# Patient Record
Sex: FEMALE | Race: WHITE | NOT HISPANIC OR LATINO | URBAN - NONMETROPOLITAN AREA
[De-identification: names, ages, dates, MRNs, and addresses within clinical notes are randomized per-mention and may not be internally consistent; named-entity substitution may affect disease eponyms.]

---

## 2018-03-07 PROBLEM — H10.423: Noted: 2019-02-26

## 2018-03-07 PROBLEM — H46.13: Noted: 2019-02-26

## 2018-03-07 PROBLEM — H16.223: Noted: 2018-03-07

## 2018-03-07 PROBLEM — H25.813: Noted: 2021-01-20

## 2018-03-07 PROBLEM — H02.403: Noted: 2019-02-26

## 2018-03-07 PROBLEM — H10.423: Noted: 2021-01-20

## 2018-03-07 PROBLEM — H52.13: Noted: 2019-10-22

## 2018-03-07 PROBLEM — H02.403: Noted: 2019-10-22

## 2018-03-07 PROBLEM — H52.4: Noted: 2021-01-20

## 2018-03-07 PROBLEM — H46.13: Noted: 2021-01-20

## 2018-03-07 PROBLEM — H52.4: Noted: 2019-10-22

## 2019-02-26 ENCOUNTER — IMPORTED ENCOUNTER (OUTPATIENT)
Dept: URBAN - NONMETROPOLITAN AREA CLINIC 1 | Facility: CLINIC | Age: 54
End: 2019-02-26

## 2019-02-26 PROCEDURE — 99213 OFFICE O/P EST LOW 20 MIN: CPT

## 2019-02-26 NOTE — PATIENT DISCUSSION
AMIE  OU severe Allergies OU- Discussed findings in detail w/ pt today. - Signs/symptoms associated discussed. - [facility] improved on today's exam. - Patient also admits that she plucks her own eyelashes out PRN as she thinks they are turning in and poking her eye. Asked her not to do this on her own as it is not a sterile technique. - Hx of punctal plug placement noted to be in place today. - Continue all drops/regimen as noted below without changes. ...- Oculus 5M Keratograph done previously (2/2018). - Patient has used Refresh (bottle) and Retaine (vials) in the past neither of them worked well. - Recommended using Refresh Optive PF OU QID PRN stressed importance of using drops that are preservative free. Pt agreed. - Continue E-mycin ointment QHS. - Continue increased water intake- Patient using sleep mask at night and doing better- d/c all Visine products never to use and explained reasoning.- Continue TheraTears Fish Oil supplementation- Continue Doxycycline 50 mg PO QD (restarted 1 month ago after a phone call request by pt)- d/c Lotemax to TID OU taper to BID and the QD.- Continue Pred Forte OU TID x 3 days BID x 3 days QD x 3 days then stop. Once completed start Bepreve OU BID PRN itching/irritation. Sample of the [facility] given today with instructions. - Patient is currently using Systane Q1.5H sample given today and Blink Tears Q1.5H. - Patient happy with the Dailies Total 1 contacts. Stressed importance of lubricating when wearing contacts. Patient may start wearing contacts again.- Continue to monitor -------------------Previous notes ----------------------------Myopia/Presbyopia - Patient happy with Daily Total 1 contact Rx given previously. - She states that she has still been getting the stringy discharge with her Cl's- MR done in the past: new glasses Rx givenOptic Neuritis:  - Discussed findings in detail w/ pt today- Hx of a flare up several years ago but better controlled now that she has her MS under control. - She does have evidence of pale nerves OU. - Baseline OCT done previously damage of optic disc noted OU. Will need to monitor closely for changes. - Baseline PACH done previously  and . - Reviewed notes from other offices from prior exams. - IOP stable last visit so we will check again next time. - Will likely need to obtain VF in near future as well but due to good [facility] OU today will hold off for now. - Continue to monitor closely. [place] OU- Discussed diagnosis in detail with patient- Discussed signs and symptoms of progression- Discussed UV protection- No treatment needed at this time - Continue to monitorPtosis OU- Discussed findings in detail w/ pt today- Signs/symptoms associated with worsening discussed. - Pt had an evaluation by Dr. Abdulaziz Rowan recently but did not recommend sx due to heavy PEK OD>OS. - Ptosis (the upper eyelid being in a lower than normal position) of the upper eyelid was explained to the patient. - Will continue to monitor PRNHx of Migraines- Discussed findings w/ pt previously- No reported headaches/problems by patient today- Monitor for recurrence PRN; Dr's Notes: Family Hx of ARMD in [place]. Opt[facility] 7/10/18 - [place] Hx_____________Oculus 5M - 2/22/18-  Oculus 5M Keratograph done previously:  Meibography:OD - about 50% lossOS - 25% lossTear menicus:  OD - avg 0.31OS - avg 0. 22Redness:  OD - 0.3OS - 0.7NIK-BUT: OD - 7.28OS - 13. 56Emycin ointment - started by previous doctor continued by me as of 3/2018Visine - d/c 1/19/18 1st appt w/ meMuro - d/c 1/19/18 1st appt w/ mePred Forte - started 1/22/18 continues 3/13/18hot compresses - started 1/22/18 d/c 3/9/18 due to irritationDoxycycline 50mg PO BID - started 2/22/18Refresh Optive Advanced - started 1/2018 continues 3/2018Retaine - started 3/7/18 but d/c 3/9/18 due to irritation/redness possible allergy

## 2019-03-29 ENCOUNTER — IMPORTED ENCOUNTER (OUTPATIENT)
Dept: URBAN - NONMETROPOLITAN AREA CLINIC 1 | Facility: CLINIC | Age: 54
End: 2019-03-29

## 2019-03-29 PROCEDURE — 99213 OFFICE O/P EST LOW 20 MIN: CPT

## 2019-03-29 NOTE — PATIENT DISCUSSION
AMIE  OU severe Allergies OU- Discussed findings in detail w/ pt today. - Signs/symptoms associated discussed. - Patient also admits that she plucks her own eyelashes out PRN as she thinks they are turning in and poking her eye. Asked her not to do this on her own as it is not a sterile technique. - Hx of punctal plug placement noted to be in place today. - Continue all drops/regimen as noted below without changes. ...- Oculus 5M Keratograph done previously (2/2018). - Patient has used Refresh (bottle) and Retaine (vials) in the past neither of them worked well. - Recommended using Refresh Optive PF OU QID PRN stressed importance of using drops that are preservative free. Pt agreed. - Continue E-mycin ointment QHS. - Continue increased water intake- Patient using sleep mask at night and doing better- d/c all Visine products never to use and explained reasoning.- Continue TheraTears Fish Oil supplementation- Continue Doxycycline 50 mg PO QD - Hx of using Pred Forte and Lotemax - Use Bepreve QAM OU. - Start Pred Forte QD OU (once before bedtime) - Patient is currently using Systane Q1.5H sample given today and Blink Tears Q1.5H. - Patient happy with the [employer] Total 1 contacts. Stressed importance of lubricating when wearing contacts. Patient may start wearing contacts again.- Continue to monitor -------------------Previous notes ----------------------------Myopia/Presbyopia - Patient happy with Daily Total 1 contact Rx given previously. - She states that she has still been getting the stringy discharge with her Cl's- MR done in the past: new glasses Rx givenOptic Neuritis:  - Discussed findings in detail w/ pt today- Hx of a flare up several years ago but better controlled now that she has her MS under control. - She does have evidence of pale nerves OU. - Baseline OCT done previously damage of optic disc noted OU. Will need to monitor closely for changes. - Baseline PACH done previously  and . - Reviewed notes from other offices from prior exams. - IOP stable last visit so we will check again next time. - Will likely need to obtain VF in near future as well but due to good [facility] OU today will hold off for now. - Continue to monitor closely. [place] OU- Discussed diagnosis in detail with patient- Discussed signs and symptoms of progression- Discussed UV protection- No treatment needed at this time - Continue to monitorPtosis OU- Discussed findings in detail w/ pt today- Signs/symptoms associated with worsening discussed. - Pt had an evaluation by Dr. Madiha Shelley recently but did not recommend sx due to heavy PEK OD>OS. - Ptosis (the upper eyelid being in a lower than normal position) of the upper eyelid was explained to the patient. - Will continue to monitor PRNHx of Migraines- Discussed findings w/ pt previously- No reported headaches/problems by patient today- Monitor for recurrence PRN; Dr's Notes: Family Hx of ARMD in Grand[place]. Optos [facility] 7/10/18 - [place] Hx_____________Oculus 5M - 2/22/18-  Oculus 5M Keratograph done previously:  Meibography:OD - about 50% lossOS - 25% lossTear menicus:  OD - avg 0.31OS - avg 0. 22Redness:  OD - 0.3OS - 0.7NIK-BUT: OD - 7.28OS - 13. 56Emycin ointment - started by previous doctor continued by me as of 3/2018Visine - d/c 1/19/18 1st appt w/ meMuro - d/c 1/19/18 1st appt w/ mePred Forte - started 1/22/18 continues 3/13/18hot compresses - started 1/22/18 d/c 3/9/18 due to irritationDoxycycline 50mg PO BID - started 2/22/18Refresh Optive Advanced - started 1/2018 continues 3/2018Retaine - started 3/7/18 but d/c 3/9/18 due to irritation/redness possible allergy

## 2019-07-01 ENCOUNTER — IMPORTED ENCOUNTER (OUTPATIENT)
Dept: URBAN - NONMETROPOLITAN AREA CLINIC 1 | Facility: CLINIC | Age: 54
End: 2019-07-01

## 2019-07-01 PROCEDURE — 99213 OFFICE O/P EST LOW 20 MIN: CPT

## 2019-07-01 NOTE — PATIENT DISCUSSION
AMIE  OU severe Allergies OU- Discussed findings in detail w/ pt today. - Signs/symptoms associated discussed. - Patient also admits that she plucks her own eyelashes out PRN as she thinks they are turning in and poking her eye. Asked her not to do this on her own as it is not a sterile technique. - Hx of punctal plug placement noted to be in place today. - Continue all drops/regimen as noted below without changes. ...- Oculus 5M Keratograph done previously (2/2018). - Patient has used Refresh (bottle) and Retaine (vials) in the past neither of them worked well. - Recommended using Refresh Optive PF OU QID PRN stressed importance of using drops that are preservative free. Pt agreed. - Continue E-mycin ointment QHS. - Continue increased water intake- Patient using sleep mask at night and doing better- d/c all Visine products never to use and explained reasoning.- Continue TheraTears Fish Oil supplementation- Continue Doxycycline 50 mg PO QD - Hx of using Pred Forte and Lotemax - Continue Bepreve QAM OU. - Continue Pred Forte QD OU (once before bedtime). Recommend spacing out and using once every other day and try to taper off of the Pred. Do not recommend patient stay on this long term. - Patient is currently using Dry Eye Relief Q1.5H sample given today and Blink Tears Q1.5H. - Patient happy with the Dailies Total 1 contacts. Stressed importance of lubricating when wearing contacts. Patient may start wearing contacts again.- Continue to monitor - Patient to come in next week for contact lens check and Rx check (do not dilate)-------------------Previous notes ----------------------------Myopia/Presbyopia - Patient happy with Daily Total 1 contact Rx given previously. - She states that she has still been getting the stringy discharge with her Cl's- MR done in the past: new glasses Rx givenOptic Neuritis:  - Discussed findings in detail w/ pt today- Hx of a flare up several years ago but better controlled now that she has her MS under control. - She does have evidence of pale nerves OU. - Baseline OCT done previously damage of optic disc noted OU. Will need to monitor closely for changes. - Baseline PACH done previously  and . - Reviewed notes from other offices from prior exams. - IOP stable last visit so we will check again next time. - Will likely need to obtain VF in near future as well but due to [facility]U today will hold off for now. - Continue to monitor closely. [place] OU- Discussed diagnosis in detail with patient- Discussed signs and symptoms of progression- Discussed UV protection- No treatment needed at this time - Continue to monitorPtosis OU- Discussed findings in detail w/ pt today- Signs/symptoms associated with worsening discussed. - Pt had an evaluation by Dr. Bonnie Bryant recently but did not recommend sx due to heavy PEK OD>OS. - Ptosis (the upper eyelid being in a lower than normal position) of the upper eyelid was explained to the patient. - Will continue to monitor PRNHx of Migraines- Discussed findings w/ pt previously- No reported headaches/problems by patient today- Monitor for recurrence PRN; Dr's Notes: Family Hx of ARMD in Grandfather. Optos [facility] 7/10/18 - [place] Hx_____________Oculus 5M - 2/22/18-  Oculus 5M Keratograph done previously:  Meibography:OD - about 50% lossOS - 25% lossTear menicus:  OD - avg 0.31OS - avg 0. 22Redness:  OD - 0.3OS - 0.7NIK-BUT: OD - 7.28OS - 13. 56Emycin ointment - started by previous doctor continued by me as of 3/2018Visine - d/c 1/19/18 1st appt w/ meMuro - d/c 1/19/18 1st appt w/ mePred Forte - started 1/22/18 continues 3/13/18hot compresses - started 1/22/18 d/c 3/9/18 due to irritationDoxycycline 50mg PO BID - started 2/22/18Refresh Optive Advanced - started 1/2018 continues 3/2018Retaine - started 3/7/18 but d/c 3/9/18 due to irritation/redness possible allergy

## 2019-09-17 ENCOUNTER — IMPORTED ENCOUNTER (OUTPATIENT)
Dept: URBAN - NONMETROPOLITAN AREA CLINIC 1 | Facility: CLINIC | Age: 54
End: 2019-09-17

## 2019-09-17 PROCEDURE — 99213 OFFICE O/P EST LOW 20 MIN: CPT

## 2019-09-17 NOTE — PATIENT DISCUSSION
AMIE  OU severe Allergies OU- Discussed findings in detail w/ pt today. - Signs/symptoms associated discussed. - Patient also admits that she plucks her own eyelashes out PRN as she thinks they are turning in and poking her eye. Asked her not to do this on her own as it is not a sterile technique. - Hx of punctal plug placement noted to be in place today. - Continue all drops/regimen as noted below without changes. ...- Oculus 5M Keratograph done previously (2/2018). - Patient has used Refresh (bottle) and Retaine (vials) in the past neither of them worked well. - Recommended using Refresh Optive PF OU QID PRN stressed importance of using drops that are preservative free. Pt agreed. - Patient using sleep mask at night and doing better- D/C all Visine products never to use and explained reasoning.- Hx of using Pred Forte and Lotemax - Start warm compresses through out the day - Continue E-mycin ointment QHS. - Continue increased water intake- Continue TheraTears Fish Oil supplementation- Continue Doxycycline 50 mg PO QD - Continue Bepreve QAM OU. - Continue Pred Forte QD OU (once before bedtime). Recommend spacing out and using once every other day and try to taper off of the Pred. Do not recommend patient stay on this long term. - Patient is currently using Dry Eye Relief Q1.5H- Patient happy with the Dailies Total 1 contacts. Stressed importance of lubricating when wearing contacts. Patient may start wearing contacts again.- Continue to monitor - Patient to come in next week for contact lens check and Rx check (do not dilate)-------------------Previous notes ----------------------------Myopia/Presbyopia - Patient happy with Daily Total 1 contact Rx given previously. - She states that she has still been getting the stringy discharge with her Cl's- MR done in the past: new glasses Rx givenOptic Neuritis:  - Discussed findings in detail w/ pt today- Hx of a flare up several years ago but better controlled now that she has her MS under control. - She does have evidence of pale nerves OU. - Baseline OCT done previously damage of optic disc noted OU. Will need to monitor closely for changes. - Baseline PACH done previously  and . - Reviewed notes from other offices from prior exams. - IOP stable last visit so we will check again next time. - Will likely need to obtain VF in near future as well but due to good [facility] OU today will hold off for now. - Continue to monitor closely. [place] OU- Discussed diagnosis in detail with patient- Discussed signs and symptoms of progression- Discussed UV protection- No treatment needed at this time - Continue to monitorPtosis OU- Discussed findings in detail w/ pt today- Signs/symptoms associated with worsening discussed. - Pt had an evaluation by Dr. Krish Ely recently but did not recommend sx due to heavy PEK OD>OS. - Ptosis (the upper eyelid being in a lower than normal position) of the upper eyelid was explained to the patient. - Will continue to monitor PRNHx of Migraines- Discussed findings w/ pt previously- No reported headaches/problems by patient today- Monitor for recurrence PRN; Dr's Notes: Family Hx of ARMD in Grandfather. Opt[facility] 7/10/18 - [place] Hx_____________Oculus 5M - 2/22/18-  Oculus 5M Keratograph done previously:  Meibography:OD - about 50% lossOS - 25% lossTear menicus:  OD - avg 0.31OS - avg 0. 22Redness:  OD - 0.3OS - 0.7NIK-BUT: OD - 7.28OS - 13. 56Emycin ointment - started by previous doctor continued by me as of 3/2018Visine - d/c 1/19/18 1st appt w/ meMuro - d/c 1/19/18 1st appt w/ mePred Forte - started 1/22/18 continues 3/13/18hot compresses - started 1/22/18 d/c 3/9/18 due to irritationDoxycycline 50mg PO BID - started 2/22/18Refresh Optive Advanced - started 1/2018 continues 3/2018Retaine - started 3/7/18 but d/c 3/9/18 due to irritation/redness possible allergy

## 2019-10-22 ENCOUNTER — IMPORTED ENCOUNTER (OUTPATIENT)
Dept: URBAN - NONMETROPOLITAN AREA CLINIC 1 | Facility: CLINIC | Age: 54
End: 2019-10-22

## 2019-10-22 PROCEDURE — 92015 DETERMINE REFRACTIVE STATE: CPT

## 2019-10-22 PROCEDURE — 92310 CONTACT LENS FITTING OU: CPT

## 2019-10-22 PROCEDURE — 92014 COMPRE OPH EXAM EST PT 1/>: CPT

## 2019-10-22 NOTE — PATIENT DISCUSSION
AMIE  OU severe Allergies OU- Discussed findings in detail w/ pt today. - Signs/symptoms associated discussed. - Patient reports she has stopped plucking her eyelashes per my recommndation and eyelids and cornea look much improved on exam today. - Hx of punctal plug placement noted to be in place today. - Continue all drops/regimen as noted below without changes. ...- Oculus 5M Keratograph done previously (2/2018). - Patient has used Refresh (bottle) and Retaine (vials) in the past neither of them worked well. - Recommended using Refresh Optive PF OU QID PRN stressed importance of using drops that are preservative free. Pt agreed. - Patient using sleep mask at night and doing better- D/C all Visine products never to use and explained reasoning.- Hx of using Pred Forte and Lotemax - Continue warm compresses through out the day - Continue E-mycin ointment QHS. - Continue increased water intake- Continue TheraTears Fish Oil supplementation- Continue Doxycycline 50 mg PO QD - Continue Bepreve QAM OU. - Continue Pred Forte QD OU (once before bedtime). Recommend spacing out and using once every other day and try to taper off of the Pred. Do not recommend patient stay on this long term. - Patient is currently using Dry Eye Relief Q1.5H- Patient happy with the [employer] Total 1 contacts. Stressed importance of lubricating when wearing contacts. Patient may start wearing contacts again.- Continue to monitor Myopia/Presbyopia - Discussed refractive status w/ pt today- MR done new GLRx given- CL trials given today as well as CLRx- Monitor yearly or PRN Optic Neuritis:  - Discussed findings in detail w/ pt today- Hx of a flare up several years ago but better controlled now that she has her MS under control. - She does have evidence of pale nerves OU. - Baseline OCT done previously damage of optic disc noted OU. Will need to monitor closely for changes. - Baseline PACH done previously  and . - Reviewed notes from other offices from prior exams. - IOP stable last visit so we will check again next time. - Will likely need to obtain VF in near future as well but due to good [facility] OU today will hold off for now. - Continue to monitor closely. [place] OU- Discussed diagnosis in detail with patient- Discussed signs and symptoms of progression- Discussed UV protection- No treatment needed at this time - Continue to monitorPtosis OU- Discussed findings in detail w/ pt today- Signs/symptoms associated with worsening discussed. - Pt had an evaluation by Dr. Nicole Mason recently but did not recommend sx due to heavy PEK OD>OS. - Ptosis (the upper eyelid being in a lower than normal position) of the upper eyelid was explained to the patient. - Will continue to monitor PRNHx of Migraines- Discussed findings w/ pt previously- No reported headaches/problems by patient today- Monitor for recurrence PRN; Dr's Notes: [facility] ARMD [facility] [place] [place] [facility] [facility]x_____________Oculus 5M - 2/22/18-  Oculus 5M Keratograph done previously:  Meibography:OD - about 50% lossOS - 25% lossTear menicus:  OD - avg 0.31OS - avg 0. 22Redness:  OD - 0.3OS - 0.7NIK-BUT: OD - 7.28OS - 13. 56Emycin ointment - started by previous doctor continued by me as of 3/2018Visine - d/c 1/19/18 1st appt w/ meMuro - d/c 1/19/18 1st appt w/ mePred Forte - started 1/22/18 continues 3/13/18hot compresses - started 1/22/18 d/c 3/9/18 due to irritationDoxycycline 50mg PO BID - started 2/22/18Refresh Optive Advanced - started 1/2018 continues 3/2018Retaine - started 3/7/18 but d/c 3/9/18 due to irritation/redness possible allergy

## 2020-07-20 ENCOUNTER — IMPORTED ENCOUNTER (OUTPATIENT)
Dept: URBAN - NONMETROPOLITAN AREA CLINIC 1 | Facility: CLINIC | Age: 55
End: 2020-07-20

## 2020-07-20 PROCEDURE — 99213 OFFICE O/P EST LOW 20 MIN: CPT

## 2020-07-20 NOTE — PATIENT DISCUSSION
AMIE  OU severe Allergies OU- Discussed findings in detail w/ pt today. - Signs/symptoms associated discussed. - Patient reports she has stopped plucking her eyelashes per my recommndation and eyelids and cornea look much improved on exam today. - Hx of punctal plug placement noted to be in place today. - Continue all drops/regimen as noted below without changes. ...- Oculus 5M Keratograph done previously (2/2018). - Patient has used Refresh (bottle) and Retaine (vials) in the past neither of them worked well. - Recommended using Refresh Optive PF OU QID PRN stressed importance of using drops that are preservative free. Pt agreed. - Patient using sleep mask at night and doing better- D/C all Visine products never to use and explained reasoning.- Hx of using Pred Forte and Lotemax - Continue warm compresses through out the day - Continue E-mycin ointment QHS. - Continue increased water intake- Continue TheraTears Fish Oil supplementation- Continue Doxycycline 50 mg PO QD patient has been taking BID - Continue Bepreve QAM OU. - Continue Pred Forte QD OU (once before bedtime). Recommend spacing out and using once every other day and try to taper off of the Pred. Do not recommend patient stay on this long term. - Patient is currently using Dry Eye Relief Q1.5H- Patient happy with the [employer] Total 1 contacts. Stressed importance of lubricating when wearing contacts. Patient may start wearing contacts again. She reports today that she has not been wearing that much- Continue to monitor Myopia/Presbyopia - Discussed refractive status w/ pt today- MR done new GLRx given- CL trials given today as well as CLRx- Monitor yearly or PRN Optic Neuritis:  - Discussed findings in detail w/ pt today- Hx of a flare up several years ago but better controlled now that she has her MS under control. - She does have evidence of pale nerves OU. - Baseline OCT done previously damage of optic disc noted OU. Will need to monitor closely for changes. - Baseline PACH done previously  and . - Reviewed notes from other offices from prior exams. - IOP stable last visit so we will check again next time. - Will likely need to obtain VF in near future as well but due to good [facility] OU today will hold off for now. - Continue to monitor closely. [place] OU- Discussed diagnosis in detail with patient- Discussed signs and symptoms of progression- Discussed UV protection- No treatment needed at this time - Continue to monitorPtosis OU- Discussed findings in detail w/ pt today- Signs/symptoms associated with worsening discussed. - Pt had an evaluation by Dr. Abdulaziz Rowan recently but did not recommend sx due to heavy PEK OD>OS. - Ptosis (the upper eyelid being in a lower than normal position) of the upper eyelid was explained to the patient. - Will continue to monitor PRNHx of Migraines- Discussed findings w/ pt previously- No reported headaches/problems by patient today- Monitor for recurrence PRN; Dr's Notes: Family Hx of ARMD in Grandfather. [facility] 7/10/18 - [place] Hx_____________Oculus 5M - 2/22/18-  Oculus 5M Keratograph done previously:  Meibography:OD - about 50% lossOS - 25% lossTear menicus:  OD - avg 0.31OS - avg 0. 22Redness:  OD - 0.3OS - 0.7NIK-BUT: OD - 7.28OS - 13. 56Emycin ointment - started by previous doctor continued by me as of 3/2018Visine - d/c 1/19/18 1st appt w/ meMuro - d/c 1/19/18 1st appt w/ mePred Forte - started 1/22/18 continues 3/13/18hot compresses - started 1/22/18 d/c 3/9/18 due to irritationDoxycycline 50mg PO BID - started 2/22/18Refresh Optive Advanced - started 1/2018 continues 3/2018Retaine - started 3/7/18 but d/c 3/9/18 due to irritation/redness possible allergy

## 2021-01-20 ENCOUNTER — IMPORTED ENCOUNTER (OUTPATIENT)
Dept: URBAN - NONMETROPOLITAN AREA CLINIC 1 | Facility: CLINIC | Age: 56
End: 2021-01-20

## 2021-01-20 PROCEDURE — 99213 OFFICE O/P EST LOW 20 MIN: CPT

## 2021-01-20 NOTE — PATIENT DISCUSSION
AMIE  OU severe Allergies OU- Discussed findings in detail w/ pt today. - Signs/symptoms associated discussed. - Patient reports she has stopped plucking her eyelashes per my recommendation and eyelids and cornea look much improved on exam today. - Hx of punctal plug placement noted to be in place today. - Oculus 5M Keratograph done previously (2/2018). - Patient has used Refresh (bottle) and Retaine (vials) in the past neither of them worked well. - Recommended using Refresh Optive PF OU QID PRN stressed importance of using drops that are preservative free. Pt agreed. - Patient using sleep mask at night and doing better- Hx of using Pred Forte and Lotemax - D/C all Visine products never to use and explained reasoning.- D/C TheraTears Fish Oil supplementation- D/C Bepreve QAM OU. - Continue warm compresses through out the day - Continue E-mycin ointment QHS. - Continue increased water intake- Continue Doxycycline 50 mg PO QD patient has been taking BID - Continue using Zaditor BID - Continue Similasin x 5-6 times a day recommend using Refresh PF instead  - Continue Pred Forte only PRN for flare ups - Start lotemax SM BID OU x 1 weeksamples given today - Patient happy with the [employer] 1 contacts. Stressed importance of lubricating when wearing contacts. She reports today that she has not been wearing CLS- Continue to monitor - RTC 2 weeks AMIE and Dr. Med Reddy to do MR Catarcts OU- Discussed diagnosis in detail with patient- Discussed signs and symptoms of progression- Discussed UV protection- No treatment needed at this time - Progression noted today - Continue to monitorMyopia/Presbyopia - Discussed refractive status w/ pt today- MR done today but hold RX - Monitor yearly or PRN Optic Neuritis:  - Discussed findings in detail w/ pt today- Hx of a flare up several years ago but better controlled now that she has her MS under control. - She does have evidence of pale nerves OU. - Baseline OCT done previously damage of optic disc noted OU. Will need to monitor closely for changes. - Baseline PACH done previously  and . - Reviewed notes from other offices from prior exams. - IOP stable last visit so we will check again next time. - Will likely need to obtain VF in near future as well but due to [facility]U today will hold off for now. - Continue to monitor closely. Ptosis OU- Discussed findings in detail w/ pt today- Signs/symptoms associated with worsening discussed. - Pt had an evaluation by Dr. Lezlie Siemens recently but did not recommend sx due to heavy PEK OD>OS. - Ptosis (the upper eyelid being in a lower than normal position) of the upper eyelid was explained to the patient. - Will continue to monitor PRNHx of Migraines- Discussed findings w/ pt previously- No reported headaches/problems by patient today- Monitor for recurrence PRN; Dr's Notes: [facility] [place] Hx_____________Oculus 5M - 2/22/18-  Oculus 5M Keratograph done previously:  Meibography:OD - about 50% lossOS - 25% lossTear menicus:  OD - avg 0.31OS - avg 0. 22Redness:  OD - 0.3OS - 0.7NIK-BUT: OD - 7.28OS - 13. 56Emycin ointment - started by previous doctor continued by me as of 3/2018Visine - d/c 1/19/18 1st appt w/ meMuro - d/c 1/19/18 1st appt w/ mePred Forte - started 1/22/18 continues 3/13/18hot compresses - started 1/22/18 d/c 3/9/18 due to irritationDoxycycline 50mg PO BID - started 2/22/18Refresh Optive Advanced - started 1/2018 continues 3/2018Retaine - started 3/7/18 but d/c 3/9/18 due to irritation/redness possible allergy

## 2021-02-03 ENCOUNTER — IMPORTED ENCOUNTER (OUTPATIENT)
Dept: URBAN - NONMETROPOLITAN AREA CLINIC 1 | Facility: CLINIC | Age: 56
End: 2021-02-03

## 2021-02-03 PROCEDURE — 92015 DETERMINE REFRACTIVE STATE: CPT

## 2021-02-03 PROCEDURE — 99213 OFFICE O/P EST LOW 20 MIN: CPT

## 2021-02-03 NOTE — PATIENT DISCUSSION
AMIE  OU severe Allergies OU- Discussed findings in detail w/ pt today. - Signs/symptoms associated discussed. - Patient reports she has stopped plucking her eyelashes per my recommendation - Hx of punctal plug placement noted to be in place today. - Oculus 5M Keratograph done previously (2/2018). - Patient has used Refresh (bottle) and Retaine (vials) in the past neither of them worked well. - Patient using sleep mask at night and doing better- D/C all Visine products never to use and explained reasoning.- D/C TheraTears Fish Oil supplementation- D/C Bepreve QAM OU. - D/C Pred Forte - D/C lotemax  - Continue E-mycin ointment QHS. - Continue Doxycycline 50 mg PO QD - Continue using Zaditor BID - D/C Similasin - Continue Systane Ultra PF through out the day - Start FML TID OU only for flare ups - D/C CLS - Continue to monitor Catarcts OU- Discussed diagnosis in detail with patient- Discussed signs and symptoms of progression- Discussed UV protection- Recommend cataract eval with Dr Stephon Weber. Patient agrees with plan - Progression noted today OU- Continue to monitorMyopia/Presbyopia - Discussed refractive status w/ pt today- MR done today by Dr. Twan Quintero today but hold RX - Continue to monitor Optic Neuritis:  - Discussed findings in detail w/ pt today- Hx of a flare up several years ago but better controlled now that she has her MS under control. - She does have evidence of pale nerves OU. - Baseline OCT done previously damage of optic disc noted OU. Will need to monitor closely for changes. - Baseline PACH done previously  and . - Reviewed notes from other offices from prior exams. - IOP stable last visit so we will check again next time. - Will likely need to obtain VF in near future as well but due to [facility]U today will hold off for now. - Continue to monitor closely. Ptosis OU- Discussed findings in detail w/ pt today- Signs/symptoms associated with worsening discussed. - Pt had an evaluation by Dr. Justin Weber recently but did not recommend sx due to heavy PEK OD>OS. - Ptosis (the upper eyelid being in a lower than normal position) of the upper eyelid was explained to the patient. - Will continue to monitor PRNHx of Migraines- Discussed findings w/ pt previously- No reported headaches/problems by patient today- Monitor for recurrence PRN; Dr's Notes: Family Hx of ARMD in Grandfather. [facility] 7/10/18 - LynchDES Tx Hx_____________Oculus 5M - 2/22/18-  Oculus 5M Keratograph done previously:  Meibography:OD - about 50% lossOS - 25% lossTear menicus:  OD - avg 0.31OS - avg 0. 22Redness:  OD - 0.3OS - 0.7NIK-BUT: OD - 7.28OS - 13. 56Emycin ointment - started by previous doctor continued by me as of 3/2018Visine - d/c 1/19/18 1st appt w/ meMuro - d/c 1/19/18 1st appt w/ mePred Forte - started 1/22/18 continues 3/13/18hot compresses - started 1/22/18 d/c 3/9/18 due to irritationDoxycycline 50mg PO BID - started 2/22/18Refresh Optive Advanced - started 1/2018 continues 3/2018Retaine - started 3/7/18 but d/c 3/9/18 due to irritation/redness possible allergy

## 2021-02-12 ENCOUNTER — IMPORTED ENCOUNTER (OUTPATIENT)
Dept: URBAN - NONMETROPOLITAN AREA CLINIC 1 | Facility: CLINIC | Age: 56
End: 2021-02-12

## 2021-02-12 PROBLEM — H16.223: Noted: 2021-02-12

## 2021-02-12 PROBLEM — H25.813: Noted: 2021-02-12

## 2021-02-12 PROBLEM — H46.13: Noted: 2021-01-20

## 2021-02-12 PROBLEM — H02.403: Noted: 2019-10-22

## 2021-02-12 PROBLEM — H10.423: Noted: 2021-01-20

## 2021-02-12 PROBLEM — H52.4: Noted: 2021-01-20

## 2021-02-12 PROCEDURE — 92014 COMPRE OPH EXAM EST PT 1/>: CPT

## 2021-02-12 NOTE — PATIENT DISCUSSION
Cataract(s)-Visually significant cataract OU .-Cataract(s) causing symptomatic impairment of visual function not correctable with a tolerable change in glasses or contact lenses lighting or non-operative means resulting in specific activity limitations and/or participation restrictions including but not limited to reading viewing television driving or meeting vocational or recreational needs. -Expectation is clearer vision and functional improvement in symptoms as well as reduced glare disability after cataract removal.-Order IOLMaster and OPD today. -Recommend Stand/Trad  based on today's OPD testing and lifestyle questionnaire.-All questions were answered regarding surgery including pre and post-op medications appointments activity restrictions and anesthetic usage.-The risks benefits and alternatives and special risk factors for the patient were discussed in detail including but not limited to: bleeding infection retinal detachment vitreous loss problems with the implant and possible need for additional surgery.-Although rare the possibility of complete vision loss was discussed.-The possible need for glasses post-operatively was discussed.-Order medical clearance exam based on history of migraine headache -Patient elects to proceed with cataract surgery OS . Will schedule at patient's convenience and re-evaluate OD  in the future. Doctors Recommendations Discussed lens w/ patient and she elects Stand/Trad OU and explained need for new glasses s/p to correct astigmatism and for near. Post op inflammation anticipated discussed Dextenza insertion after surgery. Discussed surgery @ [facility] vs [facility] and she wishes to have the surgery @ [facility].; 's Notes: Family Hx of ARMD in Grandfather. [facility] 7/10/18 - LynchDES Tx Hx_____________Oculus 5M - 2/22/18-  Oculus 5M Keratograph done previously:  Meibography:OD - about 50% lossOS - 25% lossTear menicus:  OD - avg 0.31OS - avg 0. 22Redness:  OD - 0.3OS - 0.7NIK-BUT: OD - 7.28OS - 13. 56Emycin ointment - started by previous doctor continued by me as of 3/2018Visine - d/c 1/19/18 1st appt w/ meMuro - d/c 1/19/18 1st appt w/ mePred Forte - started 1/22/18 continues 3/13/18hot compresses - started 1/22/18 d/c 3/9/18 due to irritationDoxycycline 50mg PO BID - started 2/22/18Refresh Optive Advanced - started 1/2018 continues 3/2018Retaine - started 3/7/18 but d/c 3/9/18 due to irritation/redness possible allergy

## 2021-03-01 ENCOUNTER — IMPORTED ENCOUNTER (OUTPATIENT)
Dept: URBAN - NONMETROPOLITAN AREA CLINIC 1 | Facility: CLINIC | Age: 56
End: 2021-03-01

## 2021-03-01 PROBLEM — H25.813: Noted: 2021-03-01

## 2021-03-01 PROBLEM — G43.109: Noted: 2021-03-01

## 2021-03-01 PROBLEM — H46.13: Noted: 2021-03-01

## 2021-03-01 PROBLEM — H16.223: Noted: 2021-03-01

## 2021-03-01 PROBLEM — H02.403: Noted: 2021-03-01

## 2021-03-01 PROBLEM — H52.4: Noted: 2021-03-01

## 2021-03-01 PROBLEM — H10.423: Noted: 2021-03-01

## 2021-03-01 PROCEDURE — 99213 OFFICE O/P EST LOW 20 MIN: CPT

## 2021-03-01 NOTE — PATIENT DISCUSSION
Migraine with arua- Discussed diagnosis in detail with patient - Discussed signs and symptoms - Patient reports no pain in the eyes margins are sharp. I do not feel that this is related to Mini Franco 89 - Patient does have a history of migraines - Continue to monitor PREVIOUS NOTES Cataract(s)-Visually significant cataract OU .-Cataract(s) causing symptomatic impairment of visual function not correctable with a tolerable change in glasses or contact lenses lighting or non-operative means resulting in specific activity limitations and/or participation restrictions including but not limited to reading viewing television driving or meeting vocational or recreational needs. -Expectation is clearer vision and functional improvement in symptoms as well as reduced glare disability after cataract removal.-Order IOLMaster and OPD today. -Recommend Stand/Trad  based on today's OPD testing and lifestyle questionnaire.-All questions were answered regarding surgery including pre and post-op medications appointments activity restrictions and anesthetic usage.-The risks benefits and alternatives and special risk factors for the patient were discussed in detail including but not limited to: bleeding infection retinal detachment vitreous loss problems with the implant and possible need for additional surgery.-Although rare the possibility of complete vision loss was discussed.-The possible need for glasses post-operatively was discussed.-Order medical clearance exam based on history of migraine headache -Patient elects to proceed with cataract surgery OS . Will schedule at patient's convenience and re-evaluate OD  in the future. Doctors Recommendations Discussed lens w/ patient and she elects Stand/Trad OU and explained need for new glasses s/p to correct astigmatism and for near. Post op inflammation anticipated discussed Dextenza insertion after surgery. Discussed surgery @ [facility] vs [facility] and she wishes to have the surgery @ [facility] [place].; 's Notes: Family Hx of ARMD in Grandfather. [facility] 7/10/18 - LynchDES Tx Hx_____________Oculus 5M - 2/22/18-  Oculus 5M Keratograph done previously:  Meibography:OD - about 50% lossOS - 25% lossTear menicus:  OD - avg 0.31OS - avg 0. 22Redness:  OD - 0.3OS - 0.7NIK-BUT: OD - 7.28OS - 13. 56Emycin ointment - started by previous doctor continued by me as of 3/2018Visine - d/c 1/19/18 1st appt w/ meMuro - d/c 1/19/18 1st appt w/ mePred Forte - started 1/22/18 continues 3/13/18hot compresses - started 1/22/18 d/c 3/9/18 due to irritationDoxycycline 50mg PO BID - started 2/22/18Refresh Optive Advanced - started 1/2018 continues 3/2018Retaine - started 3/7/18 but d/c 3/9/18 due to irritation/redness possible allergy

## 2021-03-09 PROBLEM — H16.223: Noted: 2021-03-09

## 2021-03-09 PROBLEM — H10.423: Noted: 2021-03-09

## 2021-03-09 PROBLEM — G43.109: Noted: 2021-03-09

## 2021-03-09 PROBLEM — H25.813: Noted: 2021-03-09

## 2021-03-17 ENCOUNTER — IMPORTED ENCOUNTER (OUTPATIENT)
Dept: URBAN - NONMETROPOLITAN AREA CLINIC 1 | Facility: CLINIC | Age: 56
End: 2021-03-17

## 2021-03-17 PROBLEM — H25.813: Noted: 2021-03-01

## 2021-03-17 PROBLEM — Z01.818: Noted: 2021-03-17

## 2021-03-17 NOTE — PATIENT DISCUSSION
Medical Clearance-Medical clearance done today. -No outstanding concerns that would preclude surgery.-Patient is cleared to proceed with scheduled surgery.; 's Notes: Family Hx of ARMD in Grandfather. [facility] 7/10/18 - [place]ila Tx Hx_____________Oculus 5M - 2/22/18-  Oculus 5M Keratograph done previously:  Meibography:OD - about 50% lossOS - 25% lossTear menicus:  OD - avg 0.31OS - avg 0. 22Redness:  OD - 0.3OS - 0.7NIK-BUT: OD - 7.28OS - 13. 56Emycin ointment - started by previous doctor continued by me as of 3/2018Visine - d/c 1/19/18 1st appt w/ meMuro - d/c 1/19/18 1st appt w/ mePred Forte - started 1/22/18 continues 3/13/18hot compresses - started 1/22/18 d/c 3/9/18 due to irritationDoxycycline 50mg PO BID - started 2/22/18Refresh Optive Advanced - started 1/2018 continues 3/2018Retaine - started 3/7/18 but d/c 3/9/18 due to irritation/redness possible allergy

## 2021-03-26 ENCOUNTER — IMPORTED ENCOUNTER (OUTPATIENT)
Dept: URBAN - NONMETROPOLITAN AREA CLINIC 1 | Facility: CLINIC | Age: 56
End: 2021-03-26

## 2021-03-26 PROBLEM — Z98.42: Noted: 2021-03-26

## 2021-03-26 PROCEDURE — 99024 POSTOP FOLLOW-UP VISIT: CPT

## 2021-03-26 NOTE — PATIENT DISCUSSION
s/p PCIOL-Pt doing well s/p PCIOL. -Continue post-op gtts according to instruction sheet and sleep with eye shield over eye for 7 nights.-Avoid bending at the waist lifting anything over 5lbs and dirty or olive environments. -RTC .; 's Notes: Family Hx of ARMD in Grandfather. [facility] 7/10/18 - LynchDES Tx Hx_____________Oculus 5M - 2/22/18-  Oculus 5M Keratograph done previously:  Meibography:OD - about 50% lossOS - 25% lossTear menicus:  OD - avg 0.31OS - avg 0. 22Redness:  OD - 0.3OS - 0.7NIK-BUT: OD - 7.28OS - 13. 56Emycin ointment - started by previous doctor continued by me as of 3/2018Visine - d/c 1/19/18 1st appt w/ meMuro - d/c 1/19/18 1st appt w/ mePred Forte - started 1/22/18 continues 3/13/18hot compresses - started 1/22/18 d/c 3/9/18 due to irritationDoxycycline 50mg PO BID - started 2/22/18Refresh Optive Advanced - started 1/2018 continues 3/2018Retaine - started 3/7/18 but d/c 3/9/18 due to irritation/redness possible allergy

## 2021-04-01 ENCOUNTER — IMPORTED ENCOUNTER (OUTPATIENT)
Dept: URBAN - NONMETROPOLITAN AREA CLINIC 1 | Facility: CLINIC | Age: 56
End: 2021-04-01

## 2021-04-01 PROBLEM — H25.811: Noted: 2021-04-01

## 2021-04-01 PROBLEM — Z98.42: Noted: 2021-03-26

## 2021-04-01 PROBLEM — Z01.818: Noted: 2021-04-01

## 2021-04-01 PROCEDURE — 99024 POSTOP FOLLOW-UP VISIT: CPT

## 2021-04-01 NOTE — PATIENT DISCUSSION
Cataract(s)-Visually significant cataract OD . -Cataract(s) causing symptomatic impairment of visual function not correctable with a tolerable change in glasses or contact lenses lighting or non-operative means resulting in specific activity limitations and/or participation restrictions including but not limited to reading viewing television driving or meeting vocational or recreational needs. -Expectation is clearer vision and functional improvement in symptoms as well as reduced glare disability after cataract removal.-Recommend Stand/trad based on previous OPD testing and lifestyle questionnaire.-All questions were answered regarding surgery including pre and post-op medications appointments activity restrictions and anesthetic usage.-The risks benefits and alternatives and special risk factors for the patient were discussed in detail including but not limited to: bleeding infection retinal detachment vitreous loss problems with the implant and possible need for additional surgery.-Although rare the possibility of complete vision loss was discussed.-The need for glasses post-operatively was discussed.-Patient elects to proceed with cataract surgery OD .s/p PCIOL-Pt doing well at 1 week s/p PCIOL. -Continue post-op gtts according to instruction sheet.-Okay to resume usual activites and d/c eye shield.; 's Notes: Family Hx of ARMD in Grandfather. [facility] 7/10/18 - LynchDES Tx Hx_____________Oculus 5M - 2/22/18-  Oculus 5M Keratograph done previously:  Meibography:OD - about 50% lossOS - 25% lossTear menicus:  OD - avg 0.31OS - avg 0. 22Redness:  OD - 0.3OS - 0.7NIK-BUT: OD - 7.28OS - 13. 56Emycin ointment - started by previous doctor continued by me as of 3/2018Visine - d/c 1/19/18 1st appt w/ meMuro - d/c 1/19/18 1st appt w/ mePred Forte - started 1/22/18 continues 3/13/18hot compresses - started 1/22/18 d/c 3/9/18 due to irritationDoxycycline 50mg PO BID - started 2/22/18Refresh Optive Advanced - started 1/2018 continues 3/2018Retaine - started 3/7/18 but d/c 3/9/18 due to irritation/redness possible allergy

## 2021-04-01 NOTE — PATIENT DISCUSSION
Medical Clearance-Medical clearance done today. -No outstanding concerns that would preclude surgery.-Patient is cleared to proceed with scheduled surgery.; 's Notes: Family Hx of ARMD in Grandfather. [facility] 7/10/18 - [place] Tx Hx_____________Oculus 5M - 2/22/18-  Oculus 5M Keratograph done previously:  Meibography:OD - about 50% lossOS - 25% lossTear menicus:  OD - avg 0.31OS - avg 0. 22Redness:  OD - 0.3OS - 0.7NIK-BUT: OD - 7.28OS - 13. 56Emycin ointment - started by previous doctor continued by me as of 3/2018Visine - d/c 1/19/18 1st appt w/ meMuro - d/c 1/19/18 1st appt w/ mePred Forte - started 1/22/18 continues 3/13/18hot compresses - started 1/22/18 d/c 3/9/18 due to irritationDoxycycline 50mg PO BID - started 2/22/18Refresh Optive Advanced - started 1/2018 continues 3/2018Retaine - started 3/7/18 but d/c 3/9/18 due to irritation/redness possible allergy

## 2021-04-13 ENCOUNTER — IMPORTED ENCOUNTER (OUTPATIENT)
Dept: URBAN - NONMETROPOLITAN AREA CLINIC 1 | Facility: CLINIC | Age: 56
End: 2021-04-13

## 2021-04-13 PROBLEM — Z98.41: Noted: 2021-04-13

## 2021-04-13 PROCEDURE — 99024 POSTOP FOLLOW-UP VISIT: CPT

## 2021-04-13 NOTE — PATIENT DISCUSSION
s/p PCIOL-Pt doing well s/p PCIOL. -Instilled one drop combigan OD -Continue post-op gtts according to instruction sheet and sleep with eye shield over eye for 7 nights.-Avoid bending at the waist lifting anything over 5lbs and dirty or olive environments. -RTC  1 wk.; 's Notes: Family Hx of ARMD in [place]father. [facility] 7/10/18 - LynchDES Tx Hx_____________Oculus 5M - 2/22/18-  Oculus 5M Keratograph done previously:  Meibography:OD - about 50% lossOS - 25% lossTear menicus:  OD - avg 0.31OS - avg 0. 22Redness:  OD - 0.3OS - 0.7NIK-BUT: OD - 7.28OS - 13. 56Emycin ointment - started by previous doctor continued by me as of 3/2018Visine - d/c 1/19/18 1st appt w/ meMuro - d/c 1/19/18 1st appt w/ mePred Forte - started 1/22/18 continues 3/13/18hot compresses - started 1/22/18 d/c 3/9/18 due to irritationDoxycycline 50mg PO BID - started 2/22/18Refresh Optive Advanced - started 1/2018 continues 3/2018Retaine - started 3/7/18 but d/c 3/9/18 due to irritation/redness possible allergy

## 2021-04-20 ENCOUNTER — IMPORTED ENCOUNTER (OUTPATIENT)
Dept: URBAN - NONMETROPOLITAN AREA CLINIC 1 | Facility: CLINIC | Age: 56
End: 2021-04-20

## 2021-04-20 PROCEDURE — 99024 POSTOP FOLLOW-UP VISIT: CPT

## 2021-04-20 NOTE — PATIENT DISCUSSION
s/p PCIOL-Pt doing well at 1 week s/p PCIOL. -Continue post-op gtts according to instruction sheet.-Okay to resume usual activites and d/c eye shield.; 's Notes: Family Hx of ARMD in Grandfather. [facility],  7/10/18 - LynchDES Tx Hx_____________Oculus 5M - 2/22/18-  Oculus 5M Keratograph done previously:  Meibography:OD - about 50% lossOS - 25% lossTear menicus:  OD - avg 0.31OS - avg 0. 22Redness:  OD - 0.3OS - 0.7NIK-BUT: OD - 7.28OS - 13. 56Emycin ointment - started by previous doctor continued by me as of 3/2018Visine - d/c 1/19/18 1st appt w/ meMuro - d/c 1/19/18 1st appt w/ mePred Forte - started 1/22/18 continues 3/13/18hot compresses - started 1/22/18 d/c 3/9/18 due to irritationDoxycycline 50mg PO BID - started 2/22/18Refresh Optive Advanced - started 1/2018 continues 3/2018Retaine - started 3/7/18 but d/c 3/9/18 due to irritation/redness possible allergy

## 2021-05-04 ENCOUNTER — IMPORTED ENCOUNTER (OUTPATIENT)
Dept: URBAN - NONMETROPOLITAN AREA CLINIC 1 | Facility: CLINIC | Age: 56
End: 2021-05-04

## 2021-05-04 PROBLEM — Z98.41: Noted: 2021-04-13

## 2021-05-04 PROBLEM — Z98.42: Noted: 2021-05-04

## 2021-05-04 PROCEDURE — 99024 POSTOP FOLLOW-UP VISIT: CPT

## 2021-05-04 PROCEDURE — 92015 DETERMINE REFRACTIVE STATE: CPT

## 2021-05-04 NOTE — PATIENT DISCUSSION
3 week POV s/p CE OU Standard/Traditional - Intraocular lenses are stable and in place - Pt is stable and doing well. - MR done today: new spectacle Rx issued - Patient not happy with OTC readers - Patient requests bifocal glasses today; discussed reading glasses only as well. - If patient wants to try OTC readers again; recommend +2.75DES OU - Discussed diagnosis in detail w/ patient - Discussed signs and symptoms associated - Recommend Pataday Alaway or Zaditor. Patient uses Signa Dura in place today - Continue to monitor; 's Notes: Family Hx of ARMD in Grandfather. [facility] 7/10/18 - LynchDES Tx Hx_____________Oculus 5M - 2/22/18-  Oculus 5M Keratograph done previously:  Meibography:OD - about 50% lossOS - 25% lossTear menicus:  OD - avg 0.31OS - avg 0. 22Redness:  OD - 0.3OS - 0.7NIK-BUT: OD - 7.28OS - 13. 56Emycin ointment - started by previous doctor continued by me as of 3/2018Visine - d/c 1/19/18 1st appt w/ meMuro - d/c 1/19/18 1st appt w/ mePred Forte - started 1/22/18 continues 3/13/18hot compresses - started 1/22/18 d/c 3/9/18 due to irritationDoxycycline 50mg PO BID - started 2/22/18Refresh Optive Advanced - started 1/2018 continues 3/2018Retaine - started 3/7/18 but d/c 3/9/18 due to irritation/redness possible allergy

## 2021-08-10 ENCOUNTER — IMPORTED ENCOUNTER (OUTPATIENT)
Dept: URBAN - NONMETROPOLITAN AREA CLINIC 1 | Facility: CLINIC | Age: 56
End: 2021-08-10

## 2021-08-10 PROBLEM — Z96.1: Noted: 2021-08-10

## 2021-08-10 PROBLEM — H26.493: Noted: 2021-08-10

## 2021-08-10 PROBLEM — H16.223: Noted: 2021-08-10

## 2021-08-10 PROCEDURE — 99213 OFFICE O/P EST LOW 20 MIN: CPT

## 2021-08-10 NOTE — PATIENT DISCUSSION
Pseudophakia OU - Discussed diagnosis in detail with patient - PCO OU noted but stable and no treatment needed at this time - Continue to monitor AMIE OU - Discussed diagnosis in detail w/ patient - Discussed signs and symptoms associated - Recommend Pataday Alaway or Zaditor. Patient uses [address] in place today - Patient has FML to use for flare ups only - Continue to monitor; Dr's Notes: [facility] of ARMD in [place]. [facility] [place] Tx Hx_____________Oculus 5M - 2/22/18-  Oculus 5M Keratograph done previously:  Meibography:OD - about 50% lossOS - 25% lossTear menicus:  OD - avg 0.31OS - avg 0. 22Redness:  OD - 0.3OS - 0.7NIK-BUT: OD - 7.28OS - 13. 56Emycin ointment - started by previous doctor continued by me as of 3/2018Visine - d/c 1/19/18 1st appt w/ meMuro - d/c 1/19/18 1st appt w/ mePred Forte - started 1/22/18 continues 3/13/18hot compresses - started 1/22/18 d/c 3/9/18 due to irritationDoxycycline 50mg PO BID - started 2/22/18Refresh Optive Advanced - started 1/2018 continues 3/2018Retaine - started 3/7/18 but d/c 3/9/18 due to irritation/redness possible allergy

## 2022-03-03 ENCOUNTER — EMERGENCY VISIT (OUTPATIENT)
Dept: URBAN - NONMETROPOLITAN AREA CLINIC 1 | Facility: CLINIC | Age: 57
End: 2022-03-03

## 2022-03-03 DIAGNOSIS — H16.223: ICD-10-CM

## 2022-03-03 PROCEDURE — 99214 OFFICE O/P EST MOD 30 MIN: CPT

## 2022-03-03 RX ORDER — FLUOROMETHOLONE 1 MG/ML: 1 SUSPENSION/ DROPS OPHTHALMIC

## 2022-03-03 ASSESSMENT — VISUAL ACUITY
OD_SC: 20/25-1
OS_SC: 20/20-1

## 2022-03-03 ASSESSMENT — TONOMETRY
OD_IOP_MMHG: 15
OS_IOP_MMHG: 15

## 2022-03-03 NOTE — PATIENT DISCUSSION
An examination for this condition will need to be performed at the next visit. Negative for Optic Neuoritis. Will consider MRI in the future if headaches continue.

## 2022-04-09 ASSESSMENT — PACHYMETRY
OS_CT_UM: 578; ADJ: THICK
OD_CT_UM: 530; ADJ: THIN
OS_CT_UM: 578; ADJ: THICK
OD_CT_UM: 530; ADJ: THIN
OS_CT_UM: 578; ADJ: THICK
OS_CT_UM: 578; ADJ: THICK
OD_CT_UM: 530; ADJ: THIN
OS_CT_UM: 578; ADJ: THICK
OS_CT_UM: 578; ADJ: THICK
OD_CT_UM: 530; ADJ: THIN
OS_CT_UM: 578; ADJ: THICK
OD_CT_UM: 530; ADJ: THIN
OD_CT_UM: 530; ADJ: THIN
OS_CT_UM: 578; ADJ: THICK
OD_CT_UM: 530; ADJ: THIN
OS_CT_UM: 578; ADJ: THICK
OD_CT_UM: 530; ADJ: THIN
OD_CT_UM: 530; ADJ: THIN
OS_CT_UM: 578; ADJ: THICK
OS_CT_UM: 578; ADJ: THICK
OD_CT_UM: 530; ADJ: THIN
OS_CT_UM: 578; ADJ: THICK
OD_CT_UM: 530; ADJ: THIN
OS_CT_UM: 578; ADJ: THICK
OS_CT_UM: 578; ADJ: THICK
OD_CT_UM: 530; ADJ: THIN

## 2022-04-09 ASSESSMENT — VISUAL ACUITY
OS_SC: 20/29
OD_SC: 20/29
OD_SC: 20/32-
OD_CC: 20/25
OS_SC: 20/22
OS_SC: 20/40-2
OD_SC: 20/30+
OD_SC: 20/50-
OS_SC: 20/50-
OS_SC: 20/25
OD_CC: 20/20-
OS_SC: 20/40
OS_CC: 20/20
OS_SC: 20/29
OS_CC: 20/20
OS_PH: 20/40
OS_CC: 20/20
OD_GLARE: 20/80
OS_PH: 20/30
OS_SC: 20/50-
OD_SC: 20/32-
OS_CC: CF4'
OS_CC: 20/20
OD_PAM: 20/25
OS_CC: 20/25
OS_SC: 20/80-
OD_CC: 20/25+1
OD_PH: 20/30-
OS_PH: 20/70-
OD_SC: 20/50-
OD_PAM: 20/25
OD_CC: 20/25+2
OS_GLARE: 20/100
OD_SC: 20/30
OD_SC: 20/29
OD_SC: 20/50
OS_SC: 20/29
OS_SC: 20/50
OS_SC: 20/20-
OD_SC: 20/40
OD_SC: 20/25-
OS_CC: 20/25+
OD_CC: CF4'
OD_PH: 20/40
OD_GLARE: 20/80
OD_PH: 20/40-
OS_AM: 20/25

## 2022-04-09 ASSESSMENT — TONOMETRY
OD_IOP_MMHG: 21
OS_IOP_MMHG: 16
OD_IOP_MMHG: 16
OD_IOP_MMHG: 21
OS_IOP_MMHG: 16
OD_IOP_MMHG: 16
OS_IOP_MMHG: 16
OD_IOP_MMHG: 15
OS_IOP_MMHG: 17
OD_IOP_MMHG: 16
OS_IOP_MMHG: 20
OS_IOP_MMHG: 15
OS_IOP_MMHG: 19
OS_IOP_MMHG: 16
OD_IOP_MMHG: 16
OD_IOP_MMHG: 16
OS_IOP_MMHG: 16
OD_IOP_MMHG: 28
OD_IOP_MMHG: 16
OS_IOP_MMHG: 18
OD_IOP_MMHG: 16
OD_IOP_MMHG: 16
OD_IOP_MMHG: 15
OD_IOP_MMHG: 14
OD_IOP_MMHG: 16
OS_IOP_MMHG: 14
OS_IOP_MMHG: 16
OS_IOP_MMHG: 14
OS_IOP_MMHG: 21
OD_IOP_MMHG: 15
OS_IOP_MMHG: 15
OS_IOP_MMHG: 16

## 2022-05-11 ENCOUNTER — COMPREHENSIVE EXAM (OUTPATIENT)
Dept: URBAN - NONMETROPOLITAN AREA CLINIC 1 | Facility: CLINIC | Age: 57
End: 2022-05-11

## 2022-05-11 DIAGNOSIS — H52.01: ICD-10-CM

## 2022-05-11 DIAGNOSIS — H52.4: ICD-10-CM

## 2022-05-11 PROCEDURE — 92014 COMPRE OPH EXAM EST PT 1/>: CPT

## 2022-05-11 PROCEDURE — 92015 DETERMINE REFRACTIVE STATE: CPT

## 2022-05-11 ASSESSMENT — TONOMETRY
OS_IOP_MMHG: 13
OD_IOP_MMHG: 15

## 2022-05-11 ASSESSMENT — VISUAL ACUITY
OD_CC: 20/30+1
OS_CC: 20/30+1

## 2023-12-12 ENCOUNTER — ESTABLISHED PATIENT (OUTPATIENT)
Dept: URBAN - NONMETROPOLITAN AREA CLINIC 1 | Facility: CLINIC | Age: 58
End: 2023-12-12

## 2023-12-12 DIAGNOSIS — H52.4: ICD-10-CM

## 2023-12-12 PROCEDURE — 92014 COMPRE OPH EXAM EST PT 1/>: CPT

## 2023-12-12 PROCEDURE — 92015 DETERMINE REFRACTIVE STATE: CPT

## 2023-12-12 ASSESSMENT — TONOMETRY
OS_IOP_MMHG: 16
OD_IOP_MMHG: 14

## 2023-12-12 ASSESSMENT — VISUAL ACUITY
OS_CC: 20/40
OS_PH: 20/25+2
OU_CC: 20/40
OD_PH: 20/25
OD_CC: 20/40

## 2024-09-25 ENCOUNTER — EMERGENCY VISIT (OUTPATIENT)
Dept: URBAN - NONMETROPOLITAN AREA CLINIC 1 | Facility: CLINIC | Age: 59
End: 2024-09-25

## 2024-09-25 DIAGNOSIS — G35: ICD-10-CM

## 2024-09-25 DIAGNOSIS — H26.493: ICD-10-CM

## 2024-09-25 DIAGNOSIS — H52.4: ICD-10-CM

## 2024-09-25 DIAGNOSIS — H16.223: ICD-10-CM

## 2024-09-25 PROCEDURE — 99213 OFFICE O/P EST LOW 20 MIN: CPT

## 2024-09-25 PROCEDURE — 92133 CPTRZD OPH DX IMG PST SGM ON: CPT | Mod: NC

## 2024-10-09 ENCOUNTER — CONSULTATION/EVALUATION (OUTPATIENT)
Dept: RURAL CLINIC 1 | Facility: CLINIC | Age: 59
End: 2024-10-09

## 2024-10-09 DIAGNOSIS — H26.493: ICD-10-CM

## 2024-10-09 PROCEDURE — 66821 AFTER CATARACT LASER SURGERY: CPT | Mod: 57,RT

## 2024-10-09 PROCEDURE — 99204 OFFICE O/P NEW MOD 45 MIN: CPT

## 2024-11-06 ENCOUNTER — POST-OP (OUTPATIENT)
Dept: URBAN - NONMETROPOLITAN AREA CLINIC 1 | Facility: CLINIC | Age: 59
End: 2024-11-06

## 2024-11-06 DIAGNOSIS — Z98.890: ICD-10-CM

## 2024-11-06 PROCEDURE — 99024 POSTOP FOLLOW-UP VISIT: CPT

## 2024-11-13 ENCOUNTER — FOLLOW UP WITH CLINIC PROCEDURE (OUTPATIENT)
Dept: RURAL CLINIC 1 | Facility: CLINIC | Age: 59
End: 2024-11-13

## 2024-11-13 DIAGNOSIS — H26.492: ICD-10-CM

## 2024-11-13 PROCEDURE — 66821 AFTER CATARACT LASER SURGERY: CPT | Mod: 79,LT

## 2024-12-23 ENCOUNTER — POST-OP (OUTPATIENT)
Age: 59
End: 2024-12-23

## 2024-12-23 DIAGNOSIS — H52.4: ICD-10-CM

## 2024-12-23 PROCEDURE — 92015 DETERMINE REFRACTIVE STATE: CPT

## 2024-12-23 PROCEDURE — 92014 COMPRE OPH EXAM EST PT 1/>: CPT

## 2025-07-15 ENCOUNTER — EMERGENCY VISIT (OUTPATIENT)
Age: 60
End: 2025-07-15

## 2025-07-15 DIAGNOSIS — H16.223: ICD-10-CM

## 2025-07-15 DIAGNOSIS — G35: ICD-10-CM

## 2025-07-15 PROCEDURE — 92134 CPTRZ OPH DX IMG PST SGM RTA: CPT | Mod: NC

## 2025-07-15 PROCEDURE — 99213 OFFICE O/P EST LOW 20 MIN: CPT

## 2025-07-15 PROCEDURE — 92133 CPTRZD OPH DX IMG PST SGM ON: CPT | Mod: NC

## 2025-07-15 RX ORDER — PREDNISOLONE ACETATE 10 MG/ML: 1 SUSPENSION/ DROPS OPHTHALMIC
